# Patient Record
Sex: FEMALE | Race: WHITE | NOT HISPANIC OR LATINO | URBAN - METROPOLITAN AREA
[De-identification: names, ages, dates, MRNs, and addresses within clinical notes are randomized per-mention and may not be internally consistent; named-entity substitution may affect disease eponyms.]

---

## 2024-05-28 ENCOUNTER — OFFICE VISIT (OUTPATIENT)
Dept: URBAN - METROPOLITAN AREA SURGERY CENTER 16 | Facility: SURGERY CENTER | Age: 69
End: 2024-05-28

## 2024-07-10 ENCOUNTER — LAB OUTSIDE AN ENCOUNTER (OUTPATIENT)
Dept: URBAN - METROPOLITAN AREA CLINIC 92 | Facility: CLINIC | Age: 69
End: 2024-07-10

## 2024-07-10 PROBLEM — 429430001: Status: ACTIVE | Noted: 2024-07-10

## 2024-07-10 PROBLEM — 428283002: Status: ACTIVE | Noted: 2024-07-10

## 2024-07-11 ENCOUNTER — TELEPHONE ENCOUNTER (OUTPATIENT)
Dept: URBAN - METROPOLITAN AREA CLINIC 92 | Facility: CLINIC | Age: 69
End: 2024-07-11

## 2024-07-11 ENCOUNTER — DASHBOARD ENCOUNTERS (OUTPATIENT)
Age: 69
End: 2024-07-11

## 2024-07-11 ENCOUNTER — OFFICE VISIT (OUTPATIENT)
Dept: URBAN - METROPOLITAN AREA CLINIC 92 | Facility: CLINIC | Age: 69
End: 2024-07-11
Payer: MEDICARE

## 2024-07-11 DIAGNOSIS — Z87.19 HISTORY OF DIVERTICULAR ABSCESS: ICD-10-CM

## 2024-07-11 DIAGNOSIS — Z86.010 PERSONAL HISTORY OF COLONIC POLYPS: ICD-10-CM

## 2024-07-11 DIAGNOSIS — K57.30 SIGMOID DIVERTICULOSIS: ICD-10-CM

## 2024-07-11 PROCEDURE — 99204 OFFICE O/P NEW MOD 45 MIN: CPT | Performed by: INTERNAL MEDICINE

## 2024-07-11 RX ORDER — LEVOTHYROXINE SODIUM 88 UG/1
TAKE 1 TABLET (88 MCG) BY ORAL ROUTE ONCE DAILY TABLET ORAL 1
Qty: 0 | Refills: 0 | Status: ACTIVE | COMMUNITY
Start: 1900-01-01

## 2024-07-11 RX ORDER — LISINOPRIL 40 MG/1
TAKE 1 TABLET (40 MG) BY ORAL ROUTE ONCE DAILY TABLET ORAL 1
Qty: 0 | Refills: 0 | Status: ACTIVE | COMMUNITY
Start: 1900-01-01

## 2024-07-11 RX ORDER — ROSUVASTATIN CALCIUM 20 MG/1
TAKE 1 TABLET (20 MG) BY ORAL ROUTE ONCE DAILY TABLET, FILM COATED ORAL 1
Qty: 0 | Refills: 0 | Status: ACTIVE | COMMUNITY
Start: 1900-01-01

## 2024-07-11 NOTE — HPI-TODAY'S VISIT:
67 yo fem ref by Dr Estrellita Zuñiga for a gi consultation. Patient had a colonoscopy on May 7, 2019 for abdominal pain, constipation and change in bowel habits by Dr. Bender.  This exam revealed pandiverticulosis and some erythema of the rectosigmoid that was biopsied there was a 5 mm ascending colon polyp that was also removed.  Pathology showed a tubular adenoma of the ascending colon and some acute colitis of the sigmoid colon rectal biopsy was normal.  Dr. Bender recommended repeat exam in 5 years or May of 2024.  Patient had a CT scan on 2019 that showed resolution of acute diverticulitis and pericolonic abscess.  There was some residual thickening of the sigmoid colon without surrounding inflammatory change.  Her last office visit was 2019 with me as a follow-up after hospitalization for diverticulitis complicated by abscess requiring drain placement.  She was seen at [facility] emergency room and CT on 2019 showed moderate acute sigmoid diverticulitis with pericolonic rim-enhancing collections adjacent to the bladder consistent with abscess and she was treated for perforated diverticulitis conservatively with a drain and antibiotics.  I had referred her at that time for a second opinion to see Dr. Maribel Treviño of colorectal surgery.  I had not seen the patient back after that visit. Pt saw Dr Tuttle back in 2019. Pt elecated to hold off on sigmoid resection. It has now been 5 yrs and she has not had any other episodes of diverticulitis. Pt denies fam hx of colon cancer. Pt is evacuating well. She denies cardiopulmonary issues. No gerd. No sleep apnea. Parents . Dad and mom lived to older ages.

## 2024-07-22 ENCOUNTER — TELEPHONE ENCOUNTER (OUTPATIENT)
Dept: URBAN - METROPOLITAN AREA CLINIC 92 | Facility: CLINIC | Age: 69
End: 2024-07-22

## 2024-08-29 ENCOUNTER — OFFICE VISIT (OUTPATIENT)
Dept: URBAN - METROPOLITAN AREA MEDICAL CENTER 28 | Facility: MEDICAL CENTER | Age: 69
End: 2024-08-29

## 2024-08-29 RX ORDER — LEVOTHYROXINE SODIUM 88 UG/1
TAKE 1 TABLET (88 MCG) BY ORAL ROUTE ONCE DAILY TABLET ORAL 1
Qty: 0 | Refills: 0 | Status: ACTIVE | COMMUNITY
Start: 1900-01-01

## 2024-08-29 RX ORDER — ROSUVASTATIN CALCIUM 20 MG/1
TAKE 1 TABLET (20 MG) BY ORAL ROUTE ONCE DAILY TABLET, FILM COATED ORAL 1
Qty: 0 | Refills: 0 | Status: ACTIVE | COMMUNITY
Start: 1900-01-01

## 2024-08-29 RX ORDER — LISINOPRIL 40 MG/1
TAKE 1 TABLET (40 MG) BY ORAL ROUTE ONCE DAILY TABLET ORAL 1
Qty: 0 | Refills: 0 | Status: ACTIVE | COMMUNITY
Start: 1900-01-01